# Patient Record
Sex: FEMALE | Race: WHITE
[De-identification: names, ages, dates, MRNs, and addresses within clinical notes are randomized per-mention and may not be internally consistent; named-entity substitution may affect disease eponyms.]

---

## 2018-03-09 ENCOUNTER — HOSPITAL ENCOUNTER (OUTPATIENT)
Dept: HOSPITAL 74 - JASUSAT | Age: 48
LOS: 1 days | Discharge: HOME | End: 2018-03-10
Attending: PLASTIC SURGERY
Payer: SELF-PAY

## 2018-03-09 VITALS — BODY MASS INDEX: 27.4 KG/M2

## 2018-03-09 DIAGNOSIS — M95.8: ICD-10-CM

## 2018-03-09 DIAGNOSIS — N65.0: Primary | ICD-10-CM

## 2018-03-09 PROCEDURE — 0HNV0ZZ RELEASE BILATERAL BREAST, OPEN APPROACH: ICD-10-PCS | Performed by: PLASTIC SURGERY

## 2018-03-09 PROCEDURE — 0JB80ZZ EXCISION OF ABDOMEN SUBCUTANEOUS TISSUE AND FASCIA, OPEN APPROACH: ICD-10-PCS | Performed by: PLASTIC SURGERY

## 2018-03-09 RX ADMIN — CEFAZOLIN SODIUM SCH MLS/HR: 1 INJECTION, SOLUTION INTRAVENOUS at 20:09

## 2018-03-09 RX ADMIN — SODIUM CHLORIDE, POTASSIUM CHLORIDE, SODIUM LACTATE AND CALCIUM CHLORIDE SCH MLS/HR: 600; 310; 30; 20 INJECTION, SOLUTION INTRAVENOUS at 20:08

## 2018-03-09 RX ADMIN — CEFAZOLIN SODIUM SCH MLS/HR: 1 INJECTION, SOLUTION INTRAVENOUS at 18:30

## 2018-03-09 RX ADMIN — MORPHINE SULFATE PRN MG: 10 INJECTION, SOLUTION INTRAMUSCULAR; INTRAVENOUS at 17:35

## 2018-03-09 NOTE — OP
Operative Note





- Note:


Operative Date: 03/09/18


Pre-Operative Diagnosis: abdominal and breast deformity


Operation: bilateral revision of breast augmentation with abdominoplasty


Findings: 





above


Post-Operative Diagnosis: Same as Pre-op


Surgeon: Goldberg,Neal D


Anesthesia: General

## 2018-03-10 VITALS — DIASTOLIC BLOOD PRESSURE: 66 MMHG | TEMPERATURE: 98.4 F | HEART RATE: 82 BPM | SYSTOLIC BLOOD PRESSURE: 109 MMHG

## 2018-03-10 RX ADMIN — HEPARIN SODIUM SCH UNIT: 5000 INJECTION, SOLUTION INTRAVENOUS; SUBCUTANEOUS at 06:25

## 2018-03-10 RX ADMIN — CEFAZOLIN SODIUM SCH MLS/HR: 1 INJECTION, SOLUTION INTRAVENOUS at 08:03

## 2018-03-10 RX ADMIN — HEPARIN SODIUM SCH: 5000 INJECTION, SOLUTION INTRAVENOUS; SUBCUTANEOUS at 09:44

## 2018-03-10 RX ADMIN — MORPHINE SULFATE PRN MG: 10 INJECTION, SOLUTION INTRAMUSCULAR; INTRAVENOUS at 07:28

## 2018-03-10 RX ADMIN — CEFAZOLIN SODIUM SCH MLS/HR: 1 INJECTION, SOLUTION INTRAVENOUS at 02:57

## 2018-03-10 RX ADMIN — SODIUM CHLORIDE, POTASSIUM CHLORIDE, SODIUM LACTATE AND CALCIUM CHLORIDE SCH: 600; 310; 30; 20 INJECTION, SOLUTION INTRAVENOUS at 07:59

## 2018-03-10 NOTE — OP
DATE OF OPERATION:  03/09/2018

 

TITLE OF PROCEDURE:   Abdominoplasty with bilateral flank liposuction, bilateral

breast implant capsulotomy with capsular revision.

 

ATTENDING SURGEON:  Neal D. Goldberg, MD

 

ANESTHESIA:  General endotracheal.

 

PREOPERATIVE DIAGNOSIS:  Breast and abdomen deformity.

 

Patient is seen in the holding area, marked of all incisions and resulting scars,

awake and aware of all incisions and resulting scars.  Risks, benefits, and

alternatives to the procedure are fully discussed, understood, agreed to proceed. 

Patient is given a gram of Ancef preoperatively.  She is given 5000 units of

subcutaneous heparin preoperatively.  TEDs and sequential compression stockings are

applied.  Patient is then brought to the operating room and placed in supine

position.  Her position is carefully checked by surgical and anesthesia teams.  A

Meléndez catheter is placed, which is removed at the end of the procedure.  The patient

is then prepped and draped in standard surgical fashion.  A timeout is called. 

Patient, procedure, site, and side are verified.

 

DESCRIPTION OF PROCEDURE:  The marked infrapannicular incision is made and dissection

carried down to the level of the abdominal wall fascia.  At this level, dissection is

continued to the level of the umbilicus.  The umbilicus is then circumcised and

developed on a fibrofatty stalk.  The flap of the abdominoplasty is then elevated to

the costal margins bilaterally and the xiphoid process in the midline.  At this

point, a midline plication is then performed with a series of interrupted, buried 1

Prolene figure-of-8 suture both above and below the umbilicus.  This is followed by a

No. 1 Prolene running, locking, reinforcing suture both above and below the

umbilicus.  Several 0 Prolene interrupted figure-of-8 sutures are then placed at key

tension points.  The endpoint is an even-tension, smooth, uniform contour to the

abdomen.  Hemostasis was meticulously achieved.  The subscarpal fat is then trimmed

with facelift scissors for an even _____ abdominoplasty flap.  Hemostasis once again

achieved.  Copious irrigation of normal saline is performed.  The patient is brought

to a 30-degree flexed position where the abdominoplasty flap is transposed.  Excess

skin and fat are excised with the skin tailor tacked location for umbilical

translocation is marked. A Star Trek pattern defect is made in the abdominoplasty

flap.  The umbilicus is converted into a Star Trek pattern where the inferior 6

o'clock notch of the abdominoplasty defect is then inset into the 6 o'clock notch of

the umbilicus.  The inset in the umbilicus performed with a series of interrupted

buried deep dermal 3-0 Monocryl suture, followed by a running 4-0 nylon suture.  Size

10 flat SACHA drains are brought out through the lateral extents of the incisions.  This

is done with the right drain along the superior recess of the wound and the left

drain along the inferior recess of the wound.  They were secured with 3-0 silk drain

sutures.  Closure of the abdominoplasty flap was then performed with a series of

interrupted superficial fascial system, buried 2-0 Vicryl suture, followed by a

series of interrupted buried deep dermal 3-0 Monocryl suture.  Prior to final

closure, liposuction is performed of bilateral flanks.  The liposuction is done with

first wetting solution of a liter of normal saline, 1 ampule of 1:1000 epinephrine,

and 20 mL of 1% lidocaine plain.  A total of 300 mL is infiltrated into each of the

flanks.  After waiting 15 minutes, liposuction is performed with a 3-mm cannula. 

Using traditional liposuction system, a total of 200 mL is aspirated from each flank.

 Endpoint is smooth, even contour.  Final closure of the abdominoplasty is made with

a running 3-0 Monocryl suture.  Several 5-0 nylon sutures are used to perfect the

alignment of the skin edges.

 

The breasts are re-prepped with Betadine, gloves are changed, and attention is then

directed toward the bilateral breasts.  It is noted that the right breast is more

ptotic and with an inferior malposition of the implant.  An incision is made first on

the right side where the incision is made along the existing scar of the previous

vertical mastopexy incision.  Dissection is carried down to the level of the

periprosthetic capsule.  A capsulotomy is performed.  The implant is delivered from

the capsule.  This is found to be Natrelle style 20, high profile, 800-mL, smooth,

round silicone gel implant.  The implant is preserved in _____ sterile saline,

triple-antibiotic solution which is a combination of 1 g of Ancef and 80 mg of

gentamicin.  An attempt to augment her projection as per her request was made with a

new Natrelle style SRX-800 mL silicone gel, smooth, round implant.  This, using a

Soria funnel, is placed sterilely into the pocket.  However, the narrower-base

diameter of the implant leaves a very distorted appearance with inadequate fill of

the medial pocket, and it is determined that this looks worse than the previous

800-mL implant that the patient previously had.  The SRX implant is removed.  It is

kept on the table.  The decision is made to maintain the patient's existing style 20,

800-mL implants as this is the largest available manufactured size, and the patient

does need the wider-base diameter.

 

With the skin tailor tacked, a mirror-image procedure is performed on the

contralateral side.  Likewise, a style 20, 800-mL, smooth, round silicone gel implant

is delivered from the capsule atraumatically.  An inferolateral capsulorrhaphy is

performed with capsular scoring and a series of 2-0 PDS figure-of-8 capsulorrhaphy

sutures inferolaterally.  Additionally, a superomedial capsulotomy is performed to

allow for more medial pole fullness.  This is performed in a mirror-image fashion

bilaterally.  With the implants replaced into the pockets using the Soria funnel and

sterile technique, the patient is brought to a seated upright position.  The symmetry

of size, shape, and position was found to be excellent.  At this point, the closure

of the capsule is performed likewise with a series of figure-of-eight 2-0 PDS suture.

 This is done after copious irrigation of the capsules with the antibiotic solution. 

The glandular tissue is repaired with a series of interrupted 3-0 Monocryl suture. 

Skin is then repaired first with a series of interrupted buried deep dermal 3-0

Monocryl suture, followed by a running subcuticular 3-0 Monocryl suture.  Dressings

are placed with 1/2-inch Steri-Strips along the abdomen and the breasts, Xeroform

gauze in the umbilicus, ABD gauze on the abdomen and breasts.  A laterally-supportive

surgical bra is applied.  An abdominal binder is applied.  Patient is woken from

anesthesia, Meléndez catheter is removed, transferred to Recovery without complication.

 

 

NEAL GOLDBERG, M.D. [id number]

DD: 03/10/2018 09:00

DT: 03/10/2018 12:01

Job #:  [id number]

## 2018-03-10 NOTE — PN
Progress Note (short form)





- Note


Progress Note: 





All tissues viable, healing well, ambulating now, using IS, VSS AF


SACHA's thin and functioning.  OK for discharge with instructions

## 2018-03-13 NOTE — PATH
Surgical Pathology Report



Patient Name:  THUY CASTANO

Accession #:  [id number]

Med. Rec. #:  [id number]                                                        

   /Age/Gender:  1970 (Age: 47) / F

Account:  [id number]                                                          

             Location: AMBULATORY SURG

Taken:  3/9/2018

Received:  3/12/2018

Reported:  3/13/2018

Physicians:  Neal David Goldberg

  



Specimen(s) Received

 ABDOMINAL SKIN AND TISSUE 





Clinical History

Cosmetic







Final Diagnosis

SKIN AND SOFT TISSUE, ABDOMEN, ABDOMINOPLASTY:

UNREMARKABLE SKIN AND SUBCUTANEOUS ADIPOSE TISSUE (GROSS ONLY).





***Electronically Signed***

Maurizio Daniel M.D.





Gross Description

Received in formalin labeled "abdominal skin and tissue," is a 624 g, 26.5 x

12.5 x 4.5 cm aggregate of two tan, triangular, unoriented portions of skin with

underlying soft tissue. The epidermal surfaces are unremarkable. Sectioning

reveals unremarkable yellow, lobulated adipose tissue. No lesions are

identified. No sections are submitted, gross only.

/3/12/2018



saudi/3/12/2018

## 2018-10-01 ENCOUNTER — HOSPITAL ENCOUNTER (OUTPATIENT)
Dept: HOSPITAL 74 - JASU-SURG | Age: 48
Discharge: HOME | End: 2018-10-01
Attending: OBSTETRICS & GYNECOLOGY
Payer: COMMERCIAL

## 2018-10-01 VITALS — TEMPERATURE: 98.2 F

## 2018-10-01 VITALS — SYSTOLIC BLOOD PRESSURE: 140 MMHG | DIASTOLIC BLOOD PRESSURE: 80 MMHG | HEART RATE: 74 BPM

## 2018-10-01 VITALS — BODY MASS INDEX: 28.3 KG/M2

## 2018-10-01 DIAGNOSIS — N93.9: ICD-10-CM

## 2018-10-01 DIAGNOSIS — D25.9: Primary | ICD-10-CM

## 2018-10-01 PROCEDURE — 0UB98ZZ EXCISION OF UTERUS, VIA NATURAL OR ARTIFICIAL OPENING ENDOSCOPIC: ICD-10-PCS | Performed by: OBSTETRICS & GYNECOLOGY

## 2018-10-01 PROCEDURE — 0UDB7ZX EXTRACTION OF ENDOMETRIUM, VIA NATURAL OR ARTIFICIAL OPENING, DIAGNOSTIC: ICD-10-PCS | Performed by: OBSTETRICS & GYNECOLOGY

## 2018-10-01 PROCEDURE — 0UJD8ZZ INSPECTION OF UTERUS AND CERVIX, VIA NATURAL OR ARTIFICIAL OPENING ENDOSCOPIC: ICD-10-PCS | Performed by: OBSTETRICS & GYNECOLOGY

## 2018-10-01 NOTE — OP
Operative Note





- Note:


Operative Date: 10/01/18 (dictation [id number])


Pre-Operative Diagnosis: submucosal fibroid, AUB


Operation: hysteroscopic myomectomy, suction D&C


Findings: 





intracavitary (Type 0) fibroid


no other findings/abnormalities


Post-Operative Diagnosis: Same as Pre-op


Surgeon: Payton Perez


Anesthesiologist/CRNA: Jeremie Pate


Anesthesia: MAC


Specimens Removed: uterine fibroid


Estimated Blood Loss (mls): 25


Drains, Volume Out (mls): 300 (300cc fluid defecit)

## 2018-10-01 NOTE — OP
DATE OF OPERATION:

 

DATE OF DICTATION:  10/01/2018

 

PREOPERATIVE DIAGNOSIS:  Abnormal uterine bleeding and intracavitary uterine fibroid.

 

POSTOPERATIVE DIAGNOSIS:  Abnormal uterine bleeding and intracavitary uterine

fibroid.

 

PROCEDURE:  Hysteroscopic myomectomy, suction dilation and curettage.

 

SURGEON:  Payton Perez DO

 

ANESTHESIA:  MAC anesthesia by Dr. Jeremie Pate 

 

COMPLICATIONS:  None.

 

ESTIMATED BLOOD LOSS:  25 mL.

 

FLUID DEFICIT:  Approximately 300 mL normal saline.

 

Sponge and instrument count correct. 

 

DISPOSITION:  Stable to PACU.  

 

BRIEF HISTORY:  The patient is a 48-year-old female who had been seen in the office

with complaints of heavy, long menstrual bleeding.  Was noted to have a submucosal

uterine fibroid noted on ultrasound.  The patient was counseled on her options, and

she elected to undergo a hysteroscopic resection of the fibroid.  The patient was

then consented for the procedure on September 26, 2018.  

 

DESCRIPTION OF PROCEDURE:  The patient was admitted to [facility] on October 1, 2018.  Consents were re-confirmed.  The patient was taken to the operating room

and given anesthesia by Dr. Jeremie Pate and placed in the dorsal lithotomy position.

 A hard time-out was performed.  A speculum was placed inside the vagina.  The

anterior lip of the cervix was grasped with a tenaculum, and the cervix was dilated

to accommodate an operative hysteroscope.  This was advanced to the fundus of the

uterus.  Bilateral tubal ostia were noted, and a large fundal, approximately 2- to

3-cm uterine fibroid was appreciated.  This was resected with a resectoscope under

direct visualization on several passes until the entire fibroid was removed.  Final

look with the camera after resection revealed no evidence of uterine trauma or

uterine perforation.  Suction dilation and curettage was performed at the end of the

case.  Specimens were sent to Pathology for evaluation.  Instruments were removed

from the vagina.  All counts were reported to be correct.  The patient tolerated the

procedure well and is recovering in stable condition in the PACU at the time of this

dictation.

 

 

 

PAYTON PEREZ DO

 

/[id number]

DD: 10/01/2018 16:41

DT: 10/01/2018 20:52

Job #:  [id number]

## 2018-10-01 NOTE — HP
History & Physical Update





- History


History: No Change





- Physical


Physical: No Change





- Assessment


Assessment: No Change





- Plan


Plan: No Change (Agree with H&P from 9/26/2018 .   Assessment: Pt with AUB and 

intracavitary fibroid - for hysteroscopic myomectomy, suction D&C.  Risks/

benefits/alternatives to procedure discussed with patient in detail.  Risks 

including bleeding/infection/uterine perforation explained.  Questions 

answered.  Consents from 9/26/18 reconfirmed.)

## 2018-10-03 NOTE — PATH
Surgical Pathology Report



Patient Name:  THUY CASTANO

Accession #:  [id number]

Med. Rec. #:  [id number]                                                        

   /Age/Gender:  1970 (Age: 48) / F

Account:  [id number]                                                          

             Location: [facility]

Taken:  10/1/2018

Received:  10/2/2018

Reported:  10/3/2018

Physicians:  Payton Perez M.D.

  



Specimen(s) Received

 UTERINE FIBROID 





Clinical History

Submucosal fibroid







Final Diagnosis

UTERUS, FIBROID, HYSTEROSCOPIC MYOMECTOMY:

FRAGMENTS OF LEIOMYOMA AND SCANT WEAKLY PROLIFERATIVE ENDOMETRIUM.





***Electronically Signed***

Xiomara Iniguez M.D.





Gross Description

Received in formalin labeled "uterine fibroid" are multiple irregular fragments

of pink-tan fibrous tissue consistent with fibroids measuring 5 x 3 x 2 cm in

aggregate. The entire specimen is submitted in 4 cassettes.

MLSZ/10/2/2018



sanml/10/2/2018